# Patient Record
Sex: MALE | Race: WHITE | Employment: UNEMPLOYED | URBAN - METROPOLITAN AREA
[De-identification: names, ages, dates, MRNs, and addresses within clinical notes are randomized per-mention and may not be internally consistent; named-entity substitution may affect disease eponyms.]

---

## 2022-06-20 ENCOUNTER — ANESTHESIA EVENT (OUTPATIENT)
Dept: OPERATING ROOM | Age: 62
DRG: 989 | End: 2022-06-20

## 2022-06-20 ENCOUNTER — ANESTHESIA (OUTPATIENT)
Dept: OPERATING ROOM | Age: 62
DRG: 989 | End: 2022-06-20

## 2022-06-20 ENCOUNTER — APPOINTMENT (OUTPATIENT)
Dept: CT IMAGING | Age: 62
DRG: 989 | End: 2022-06-20

## 2022-06-20 ENCOUNTER — HOSPITAL ENCOUNTER (INPATIENT)
Age: 62
LOS: 1 days | Discharge: HOME | DRG: 989 | End: 2022-06-21
Attending: INTERNAL MEDICINE | Admitting: INTERNAL MEDICINE

## 2022-06-20 DIAGNOSIS — N32.89 BLADDER HEMORRHAGE: Primary | ICD-10-CM

## 2022-06-20 PROBLEM — R31.0 GROSS HEMATURIA: Status: ACTIVE | Noted: 2022-06-20

## 2022-06-20 LAB
A/G RATIO: 1.6 (ref 1.1–2.2)
ALBUMIN SERPL-MCNC: 4.4 G/DL (ref 3.4–5)
ALP BLD-CCNC: 90 U/L (ref 40–129)
ALT SERPL-CCNC: 23 U/L (ref 10–40)
ANION GAP SERPL CALCULATED.3IONS-SCNC: 11 MMOL/L (ref 3–16)
AST SERPL-CCNC: 16 U/L (ref 15–37)
BASOPHILS ABSOLUTE: 0.1 K/UL (ref 0–0.2)
BASOPHILS RELATIVE PERCENT: 0.7 %
BILIRUB SERPL-MCNC: 0.9 MG/DL (ref 0–1)
BILIRUBIN URINE: ABNORMAL
BLOOD, URINE: ABNORMAL
BUN BLDV-MCNC: 10 MG/DL (ref 7–20)
CALCIUM SERPL-MCNC: 10.2 MG/DL (ref 8.3–10.6)
CHLORIDE BLD-SCNC: 100 MMOL/L (ref 99–110)
CLARITY: ABNORMAL
CO2: 25 MMOL/L (ref 21–32)
COLOR: ABNORMAL
COMMENT UA: ABNORMAL
CREAT SERPL-MCNC: 1 MG/DL (ref 0.8–1.3)
EOSINOPHILS ABSOLUTE: 0.1 K/UL (ref 0–0.6)
EOSINOPHILS RELATIVE PERCENT: 0.7 %
GFR AFRICAN AMERICAN: >60
GFR NON-AFRICAN AMERICAN: >60
GLUCOSE BLD-MCNC: 134 MG/DL (ref 70–99)
GLUCOSE BLD-MCNC: 92 MG/DL (ref 70–99)
GLUCOSE URINE: ABNORMAL MG/DL
HCT VFR BLD CALC: 40.5 % (ref 40.5–52.5)
HEMOGLOBIN: 13.5 G/DL (ref 13.5–17.5)
KETONES, URINE: ABNORMAL MG/DL
LEUKOCYTE ESTERASE, URINE: ABNORMAL
LYMPHOCYTES ABSOLUTE: 1.6 K/UL (ref 1–5.1)
LYMPHOCYTES RELATIVE PERCENT: 13.9 %
MCH RBC QN AUTO: 27.4 PG (ref 26–34)
MCHC RBC AUTO-ENTMCNC: 33.2 G/DL (ref 31–36)
MCV RBC AUTO: 82.6 FL (ref 80–100)
MICROSCOPIC EXAMINATION: YES
MONOCYTES ABSOLUTE: 0.6 K/UL (ref 0–1.3)
MONOCYTES RELATIVE PERCENT: 5 %
NEUTROPHILS ABSOLUTE: 9.2 K/UL (ref 1.7–7.7)
NEUTROPHILS RELATIVE PERCENT: 79.7 %
NITRITE, URINE: ABNORMAL
PDW BLD-RTO: 12.9 % (ref 12.4–15.4)
PERFORMED ON: NORMAL
PH UA: ABNORMAL (ref 5–8)
PLATELET # BLD: 349 K/UL (ref 135–450)
PMV BLD AUTO: 7.6 FL (ref 5–10.5)
POTASSIUM SERPL-SCNC: 4 MMOL/L (ref 3.5–5.1)
PROTEIN UA: ABNORMAL MG/DL
RBC # BLD: 4.9 M/UL (ref 4.2–5.9)
RBC UA: ABNORMAL /HPF (ref 0–4)
SODIUM BLD-SCNC: 136 MMOL/L (ref 136–145)
SPECIFIC GRAVITY UA: ABNORMAL (ref 1–1.03)
TOTAL PROTEIN: 7.2 G/DL (ref 6.4–8.2)
URINE REFLEX TO CULTURE: ABNORMAL
URINE TYPE: ABNORMAL
UROBILINOGEN, URINE: ABNORMAL E.U./DL
WBC # BLD: 11.5 K/UL (ref 4–11)
WBC UA: ABNORMAL /HPF (ref 0–5)

## 2022-06-20 PROCEDURE — 2580000003 HC RX 258: Performed by: NURSE ANESTHETIST, CERTIFIED REGISTERED

## 2022-06-20 PROCEDURE — 3600000004 HC SURGERY LEVEL 4 BASE: Performed by: UROLOGY

## 2022-06-20 PROCEDURE — 2709999900 HC NON-CHARGEABLE SUPPLY: Performed by: UROLOGY

## 2022-06-20 PROCEDURE — 1200000000 HC SEMI PRIVATE

## 2022-06-20 PROCEDURE — 7100000000 HC PACU RECOVERY - FIRST 15 MIN: Performed by: UROLOGY

## 2022-06-20 PROCEDURE — 80053 COMPREHEN METABOLIC PANEL: CPT

## 2022-06-20 PROCEDURE — 0TCB8ZZ EXTIRPATION OF MATTER FROM BLADDER, VIA NATURAL OR ARTIFICIAL OPENING ENDOSCOPIC: ICD-10-PCS | Performed by: UROLOGY

## 2022-06-20 PROCEDURE — 6370000000 HC RX 637 (ALT 250 FOR IP): Performed by: UROLOGY

## 2022-06-20 PROCEDURE — 3700000001 HC ADD 15 MINUTES (ANESTHESIA): Performed by: UROLOGY

## 2022-06-20 PROCEDURE — 3700000000 HC ANESTHESIA ATTENDED CARE: Performed by: UROLOGY

## 2022-06-20 PROCEDURE — 3600000014 HC SURGERY LEVEL 4 ADDTL 15MIN: Performed by: UROLOGY

## 2022-06-20 PROCEDURE — 2580000003 HC RX 258: Performed by: UROLOGY

## 2022-06-20 PROCEDURE — 0V508ZZ DESTRUCTION OF PROSTATE, VIA NATURAL OR ARTIFICIAL OPENING ENDOSCOPIC: ICD-10-PCS | Performed by: UROLOGY

## 2022-06-20 PROCEDURE — 6360000002 HC RX W HCPCS: Performed by: NURSE ANESTHETIST, CERTIFIED REGISTERED

## 2022-06-20 PROCEDURE — 85025 COMPLETE CBC W/AUTO DIFF WBC: CPT

## 2022-06-20 PROCEDURE — 99285 EMERGENCY DEPT VISIT HI MDM: CPT

## 2022-06-20 PROCEDURE — 2720000010 HC SURG SUPPLY STERILE: Performed by: UROLOGY

## 2022-06-20 PROCEDURE — 74176 CT ABD & PELVIS W/O CONTRAST: CPT

## 2022-06-20 PROCEDURE — 7100000001 HC PACU RECOVERY - ADDTL 15 MIN: Performed by: UROLOGY

## 2022-06-20 PROCEDURE — 6360000002 HC RX W HCPCS: Performed by: UROLOGY

## 2022-06-20 PROCEDURE — 81001 URINALYSIS AUTO W/SCOPE: CPT

## 2022-06-20 RX ORDER — PROPOFOL 10 MG/ML
INJECTION, EMULSION INTRAVENOUS PRN
Status: DISCONTINUED | OUTPATIENT
Start: 2022-06-20 | End: 2022-06-20 | Stop reason: SDUPTHER

## 2022-06-20 RX ORDER — SODIUM CHLORIDE 0.9 % (FLUSH) 0.9 %
5-40 SYRINGE (ML) INJECTION EVERY 12 HOURS SCHEDULED
Status: DISCONTINUED | OUTPATIENT
Start: 2022-06-20 | End: 2022-06-20 | Stop reason: HOSPADM

## 2022-06-20 RX ORDER — LIDOCAINE HYDROCHLORIDE 20 MG/ML
JELLY TOPICAL
Status: COMPLETED | OUTPATIENT
Start: 2022-06-20 | End: 2022-06-20

## 2022-06-20 RX ORDER — SODIUM CHLORIDE 9 MG/ML
INJECTION, SOLUTION INTRAVENOUS CONTINUOUS PRN
Status: DISCONTINUED | OUTPATIENT
Start: 2022-06-20 | End: 2022-06-20 | Stop reason: SDUPTHER

## 2022-06-20 RX ORDER — POLYETHYLENE GLYCOL 3350 17 G/17G
17 POWDER, FOR SOLUTION ORAL DAILY PRN
Status: DISCONTINUED | OUTPATIENT
Start: 2022-06-20 | End: 2022-06-21 | Stop reason: HOSPADM

## 2022-06-20 RX ORDER — ACETAMINOPHEN 650 MG/1
650 SUPPOSITORY RECTAL EVERY 6 HOURS PRN
Status: DISCONTINUED | OUTPATIENT
Start: 2022-06-20 | End: 2022-06-21 | Stop reason: HOSPADM

## 2022-06-20 RX ORDER — DIPHENHYDRAMINE HYDROCHLORIDE 50 MG/ML
12.5 INJECTION INTRAMUSCULAR; INTRAVENOUS
Status: DISCONTINUED | OUTPATIENT
Start: 2022-06-20 | End: 2022-06-20 | Stop reason: HOSPADM

## 2022-06-20 RX ORDER — OXYCODONE HYDROCHLORIDE 5 MG/1
10 TABLET ORAL PRN
Status: DISCONTINUED | OUTPATIENT
Start: 2022-06-20 | End: 2022-06-20 | Stop reason: HOSPADM

## 2022-06-20 RX ORDER — HYDROCODONE BITARTRATE AND ACETAMINOPHEN 5; 325 MG/1; MG/1
1 TABLET ORAL EVERY 6 HOURS PRN
Status: DISCONTINUED | OUTPATIENT
Start: 2022-06-20 | End: 2022-06-21 | Stop reason: HOSPADM

## 2022-06-20 RX ORDER — SODIUM CHLORIDE 9 MG/ML
INJECTION, SOLUTION INTRAVENOUS PRN
Status: DISCONTINUED | OUTPATIENT
Start: 2022-06-20 | End: 2022-06-21 | Stop reason: HOSPADM

## 2022-06-20 RX ORDER — LANOLIN ALCOHOL/MO/W.PET/CERES
3 CREAM (GRAM) TOPICAL NIGHTLY PRN
Status: DISCONTINUED | OUTPATIENT
Start: 2022-06-20 | End: 2022-06-21 | Stop reason: HOSPADM

## 2022-06-20 RX ORDER — ACETAMINOPHEN 325 MG/1
650 TABLET ORAL EVERY 6 HOURS PRN
Status: DISCONTINUED | OUTPATIENT
Start: 2022-06-20 | End: 2022-06-21 | Stop reason: HOSPADM

## 2022-06-20 RX ORDER — ONDANSETRON 2 MG/ML
4 INJECTION INTRAMUSCULAR; INTRAVENOUS
Status: DISCONTINUED | OUTPATIENT
Start: 2022-06-20 | End: 2022-06-20 | Stop reason: HOSPADM

## 2022-06-20 RX ORDER — ATROPA BELLADONNA AND OPIUM 16.2; 3 MG/1; MG/1
30 SUPPOSITORY RECTAL EVERY 8 HOURS PRN
Status: DISCONTINUED | OUTPATIENT
Start: 2022-06-20 | End: 2022-06-21 | Stop reason: HOSPADM

## 2022-06-20 RX ORDER — ONDANSETRON 4 MG/1
4 TABLET, ORALLY DISINTEGRATING ORAL EVERY 8 HOURS PRN
Status: DISCONTINUED | OUTPATIENT
Start: 2022-06-20 | End: 2022-06-21 | Stop reason: HOSPADM

## 2022-06-20 RX ORDER — DEXTROSE MONOHYDRATE 50 MG/ML
100 INJECTION, SOLUTION INTRAVENOUS PRN
Status: DISCONTINUED | OUTPATIENT
Start: 2022-06-20 | End: 2022-06-21 | Stop reason: HOSPADM

## 2022-06-20 RX ORDER — LABETALOL HYDROCHLORIDE 5 MG/ML
10 INJECTION, SOLUTION INTRAVENOUS
Status: DISCONTINUED | OUTPATIENT
Start: 2022-06-20 | End: 2022-06-20 | Stop reason: HOSPADM

## 2022-06-20 RX ORDER — MEPERIDINE HYDROCHLORIDE 25 MG/ML
12.5 INJECTION INTRAMUSCULAR; INTRAVENOUS; SUBCUTANEOUS
Status: DISCONTINUED | OUTPATIENT
Start: 2022-06-20 | End: 2022-06-20 | Stop reason: HOSPADM

## 2022-06-20 RX ORDER — OXYCODONE HYDROCHLORIDE 5 MG/1
5 TABLET ORAL PRN
Status: DISCONTINUED | OUTPATIENT
Start: 2022-06-20 | End: 2022-06-20 | Stop reason: HOSPADM

## 2022-06-20 RX ORDER — SODIUM CHLORIDE 0.9 % (FLUSH) 0.9 %
5-40 SYRINGE (ML) INJECTION PRN
Status: DISCONTINUED | OUTPATIENT
Start: 2022-06-20 | End: 2022-06-21 | Stop reason: HOSPADM

## 2022-06-20 RX ORDER — SODIUM CHLORIDE 0.9 % (FLUSH) 0.9 %
5-40 SYRINGE (ML) INJECTION EVERY 12 HOURS SCHEDULED
Status: DISCONTINUED | OUTPATIENT
Start: 2022-06-20 | End: 2022-06-21 | Stop reason: HOSPADM

## 2022-06-20 RX ORDER — MAGNESIUM HYDROXIDE 1200 MG/15ML
LIQUID ORAL CONTINUOUS PRN
Status: COMPLETED | OUTPATIENT
Start: 2022-06-20 | End: 2022-06-20

## 2022-06-20 RX ORDER — INSULIN LISPRO 100 [IU]/ML
0-6 INJECTION, SOLUTION INTRAVENOUS; SUBCUTANEOUS
Status: DISCONTINUED | OUTPATIENT
Start: 2022-06-21 | End: 2022-06-21 | Stop reason: HOSPADM

## 2022-06-20 RX ORDER — SODIUM CHLORIDE 9 MG/ML
25 INJECTION, SOLUTION INTRAVENOUS PRN
Status: DISCONTINUED | OUTPATIENT
Start: 2022-06-20 | End: 2022-06-20 | Stop reason: HOSPADM

## 2022-06-20 RX ORDER — FENTANYL CITRATE 50 UG/ML
INJECTION, SOLUTION INTRAMUSCULAR; INTRAVENOUS PRN
Status: DISCONTINUED | OUTPATIENT
Start: 2022-06-20 | End: 2022-06-20 | Stop reason: SDUPTHER

## 2022-06-20 RX ORDER — HALOPERIDOL 5 MG/ML
1 INJECTION INTRAMUSCULAR
Status: DISCONTINUED | OUTPATIENT
Start: 2022-06-20 | End: 2022-06-20 | Stop reason: HOSPADM

## 2022-06-20 RX ORDER — MAGNESIUM HYDROXIDE 1200 MG/15ML
LIQUID ORAL
Status: COMPLETED | OUTPATIENT
Start: 2022-06-20 | End: 2022-06-20

## 2022-06-20 RX ORDER — HYDRALAZINE HYDROCHLORIDE 20 MG/ML
10 INJECTION INTRAMUSCULAR; INTRAVENOUS
Status: DISCONTINUED | OUTPATIENT
Start: 2022-06-20 | End: 2022-06-20 | Stop reason: HOSPADM

## 2022-06-20 RX ORDER — FENTANYL CITRATE 50 UG/ML
50 INJECTION, SOLUTION INTRAMUSCULAR; INTRAVENOUS EVERY 5 MIN PRN
Status: DISCONTINUED | OUTPATIENT
Start: 2022-06-20 | End: 2022-06-20 | Stop reason: HOSPADM

## 2022-06-20 RX ORDER — FENTANYL CITRATE 50 UG/ML
25 INJECTION, SOLUTION INTRAMUSCULAR; INTRAVENOUS EVERY 5 MIN PRN
Status: DISCONTINUED | OUTPATIENT
Start: 2022-06-20 | End: 2022-06-20 | Stop reason: HOSPADM

## 2022-06-20 RX ORDER — ONDANSETRON 2 MG/ML
4 INJECTION INTRAMUSCULAR; INTRAVENOUS EVERY 6 HOURS PRN
Status: DISCONTINUED | OUTPATIENT
Start: 2022-06-20 | End: 2022-06-21 | Stop reason: HOSPADM

## 2022-06-20 RX ORDER — SODIUM CHLORIDE 0.9 % (FLUSH) 0.9 %
5-40 SYRINGE (ML) INJECTION PRN
Status: DISCONTINUED | OUTPATIENT
Start: 2022-06-20 | End: 2022-06-20 | Stop reason: HOSPADM

## 2022-06-20 RX ADMIN — FENTANYL CITRATE 50 MCG: 50 INJECTION, SOLUTION INTRAMUSCULAR; INTRAVENOUS at 18:15

## 2022-06-20 RX ADMIN — SODIUM CHLORIDE: 9 INJECTION, SOLUTION INTRAVENOUS at 18:09

## 2022-06-20 RX ADMIN — FENTANYL CITRATE 50 MCG: 50 INJECTION, SOLUTION INTRAMUSCULAR; INTRAVENOUS at 18:37

## 2022-06-20 RX ADMIN — CEFAZOLIN 2000 MG: 2 INJECTION, POWDER, FOR SOLUTION INTRAMUSCULAR; INTRAVENOUS at 18:05

## 2022-06-20 RX ADMIN — PROPOFOL 50 MG: 10 INJECTION, EMULSION INTRAVENOUS at 18:13

## 2022-06-20 RX ADMIN — PROPOFOL 140 MCG/KG/MIN: 10 INJECTION, EMULSION INTRAVENOUS at 18:14

## 2022-06-20 ASSESSMENT — ENCOUNTER SYMPTOMS
RESPIRATORY NEGATIVE: 1
EYES NEGATIVE: 1
GASTROINTESTINAL NEGATIVE: 1

## 2022-06-20 ASSESSMENT — PAIN - FUNCTIONAL ASSESSMENT: PAIN_FUNCTIONAL_ASSESSMENT: NONE - DENIES PAIN

## 2022-06-20 NOTE — ANESTHESIA POSTPROCEDURE EVALUATION
Department of Anesthesiology  Postprocedure Note    Patient: Bk Poon  MRN: [id number]  YOB: 1960  Date of evaluation: 6/20/2022      Procedure Summary     Date: 06/20/22 Room / Location: [facility] OR [address] [place] [address]    Anesthesia Start: 1809 Anesthesia Stop: 1850    Procedure: [address] (N/A ) Diagnosis:       Hematuria, unspecified type      (Hematuria, unspecified type [R31.9])    Surgeons: Lakisha Liu MD Responsible Provider: Raudel Richards MD    Anesthesia Type: MAC ASA Status: 3          Anesthesia Type: No value filed.     Ruma Phase I: Ruma Score: 10    Ruma Phase II:      Last vitals:   Vitals Value Taken Time   /75 06/20/22 1900   Temp 97.8 °F (36.6 °C) 06/20/22 1900   Pulse 72 06/20/22 1900   Resp 15 06/20/22 1900   SpO2 99 % 06/20/22 1900         Anesthesia Post Evaluation    Patient location during evaluation: PACU  Patient participation: complete - patient participated  Level of consciousness: awake and alert  Airway patency: patent  Nausea & Vomiting: no nausea and no vomiting  Complications: no  Cardiovascular status: blood pressure returned to baseline  Respiratory status: acceptable  Hydration status: euvolemic  Multimodal analgesia pain management approach

## 2022-06-20 NOTE — PROGRESS NOTES
Phase 1 recovery completed, hospitalist to do admission orders. VSS. Urine remains clear. Will transfer.

## 2022-06-20 NOTE — H&P
HOSPITALISTS HISTORY AND PHYSICAL    6/20/2022 7:50 PM    Patient Information:  Remberto Allen is a 63 y.o. male [id number]  PCP:  RESHMA Rodriguez CNP (Tel: [phone] )    Chief complaint:    Chief Complaint   Patient presents with    Urinary Catheter     Recently had TURP done 6/7; has been having blood in Leonardo since Wed 6/15. Dr. Beckie Ross replaced Leonardo Catheter today. Beckie Ross states most likely will be admitted; Dr. March Roads to see and will need CBC, BUN, Cr.     Hematuria        History of Present Illness:  Mane Barragan is a 63 y.o. male with hx HTN, HLD, CAD, and Type II DM. Patient presents the emergency room earlier today for gross hematuria. He had a TURP procedure done on 6/7/22. He started having blood in his leonardo catheter on 6/15/22. He is on ASA and plavix at home. He held them prior to procedure but restarted on 6/10/22. His last dose was last evening. He was Dr. Beckie Ross earlier today to have catheter replaced. He continued to have bleeding so came to ER. He denies any fever, N/V, or abdominal pain. Mild discomfort from catheter. In ER CT of abdomen showed large amount of bleeding in Bladder. He was taken to OR for Cystoscopy with clot evacuation. 80cc of old blood removed, slight oozing from prostate fossa. CBI was continued post op.   hgb 13.5 prior to procedure. History obtained from patient     REVIEW OF SYSTEMS:   Review of Systems   Constitutional: Negative. HENT: Negative. Eyes: Negative. Respiratory: Negative. Cardiovascular: Negative. Gastrointestinal: Negative. Genitourinary: Positive for difficulty urinating, hematuria and penile pain. Musculoskeletal: Negative. Neurological: Negative. Hematological: Negative. Psychiatric/Behavioral: Negative. All other systems reviewed and are negative.       Past Medical History:   has a past medical history of Diabetes mellitus ([facility] [address] and Hypertension. Past Surgical History:   has a past surgical history that includes TURP and Coronary angioplasty with stent. Medications:  No current facility-administered medications on file prior to encounter. No current outpatient medications on file prior to encounter. Allergies: Allergies   Allergen Reactions    Zocor [Simvastatin] Nausea Only        Social History:  Patient Lives with wife   reports that he has quit smoking. He has never used smokeless tobacco. He reports current alcohol use. He reports that he does not use drugs. Family History:  family history includes No Known Problems in his mother. ,     Physical Exam:  /75   Pulse 72   Temp 97.8 °F (36.6 °C) (Temporal)   Resp 15   Ht 5' 11\" (1.803 m)   Wt 249 lb (112.9 kg)   SpO2 99%   BMI 34.73 kg/m²   Physical Exam  Vitals and nursing note reviewed. Constitutional:       Appearance: Normal appearance. He is not ill-appearing. HENT:      Head: Normocephalic. Mouth/Throat:      Mouth: Mucous membranes are moist.   Eyes:      Extraocular Movements: Extraocular movements intact. Pupils: Pupils are equal, round, and reactive to light. Cardiovascular:      Rate and Rhythm: Normal rate and regular rhythm. Pulses: Normal pulses. Heart sounds: No murmur heard. Pulmonary:      Effort: Pulmonary effort is normal. No respiratory distress. Breath sounds: Normal breath sounds. Abdominal:      General: Abdomen is flat. Bowel sounds are normal. There is no distension. Palpations: Abdomen is soft. Tenderness: There is no abdominal tenderness. Musculoskeletal:         General: Normal range of motion. Skin:     General: Skin is warm and dry. Capillary Refill: Capillary refill takes less than 2 seconds. Neurological:      General: No focal deficit present. Mental Status: He is alert and oriented to person, place, and time. Cranial Nerves: No cranial nerve deficit. Psychiatric:         Mood and Affect: Mood normal.         Behavior: Behavior normal.         Thought Content: Thought content normal.         Labs:  CBC:   Lab Results   Component Value Date    WBC 11.5 06/20/2022    RBC 4.90 06/20/2022    HGB 13.5 06/20/2022    HCT 40.5 06/20/2022    MCV 82.6 06/20/2022    MCH 27.4 06/20/2022    MCHC 33.2 06/20/2022    RDW 12.9 06/20/2022     06/20/2022    MPV 7.6 06/20/2022     BMP:    Lab Results   Component Value Date     06/20/2022    K 4.0 06/20/2022     06/20/2022    CO2 25 06/20/2022    BUN 10 06/20/2022    CREATININE 1.0 06/20/2022    CALCIUM 10.2 06/20/2022    GFRAA >60 06/20/2022    LABGLOM >60 06/20/2022    GLUCOSE 134 06/20/2022     CT ABDOMEN PELVIS WO CONTRAST Additional Contrast? None   Final Result   Large amount of hemorrhage within the bladder. Chest Xray:   EKG:    I visualized CXR images and EKG strips    Problem List  Principal Problem:    Gross hematuria  Resolved Problems:    * No resolved hospital problems. *        Assessment/Plan:   1. Gross Hematuria S/P Cystoscopy and Clot Evacuation  Admit inpatient  Urology following  Continuous bladder irrigation,  Drainage clear at this time  hgb 13.5,  Recheck in am, if has further bleeding will check sooner  Hold ASA and plavix  Patient reports minimal discomfort from catheter. No N/V and ready to eat. 2. Hypertension  BP stable. [facility]ing medication list from [email or web address]    3. Hyperlipidemia  Continue statin    4. CAD with PCI 10/2019  Hold ASA and plavix tonight, further recommendations per Urology and Cardiology  He follows with Cardiology from [facility].     5. Type II DM  Diet controlled  He recently lost 35lbs, with diet changes and medications. DVT prophylaxis SCD  Code status Full   Diet Diabetic   IV access peripheral   Ferguson Catheter CBI,    Admit as inpatient.  I anticipate hospitalization spanning more than two midnights for investigation and treatment of the above medically necessary diagnoses. Please note that some part of this chart was generated using Dragon dictation software. Although every effort was made to ensure the accuracy of this automated transcription, some errors in transcription may have occurred inadvertently. If you may need any clarification, please do not hesitate to contact me through [facility].        RESMHA Gallego CNP    6/20/2022 7:50 PM

## 2022-06-20 NOTE — ED PROVIDER NOTES
[address] [facility]        Pt Name: Angelito Davis  MRN: [id number]  [place] 1960  Date of evaluation: 6/20/2022  Provider: Cole Ferrara PA-C  PCP: RESHMA Catalan CNP  Note Started: 12:23 PM EDT       TIFFANY. I have evaluated this patient. My supervising physician was available for consultation. CHIEF COMPLAINT       Chief Complaint   Patient presents with    Urinary Catheter     Recently had TURP done 6/7; has been having blood in Ferguson since Wed 6/15. Dr. Bronson Salazar replaced Ferguson Catheter today. Bronson Salazar states most likely will be admitted; Dr. Arina Hernandez to see and will need CBC, BUN, Cr.     Hematuria       HISTORY OF PRESENT ILLNESS   (Location, Timing/Onset, Context/Setting, Quality, Duration, Modifying Factors, Severity, Associated Signs and Symptoms)  Note limiting factors. Chief Complaint: hematuria     Angelito Davis is a 63 y.o. male who presents for evaluation of hematuria. Patient states that he had TURP on 6/7/2022 and restarted aspirin and plavix on 6/10. Then, on 6/15 patient started noticing blood and blood clots in his catheter bag. He states that he saw Dr. Bronson Salazar today and Dr. Bronson Salazar replaced his catheter. Patient denies fever, infection, shortness of breath, abdominal pain, pelvic pain,  leg pain/swelling, nausea, vomiting, diarrhea, penile discharge or pain, testicular pain or swelling. Nursing Notes were all reviewed and agreed with or any disagreements were addressed in the HPI. REVIEW OF SYSTEMS    (2-9 systems for level 4, 10 or more for level 5)     Review of Systems    Positives and Pertinent negatives as per HPI. Except as noted above in the ROS, all other systems were reviewed and negative. PAST MEDICAL HISTORY     Past Medical History:   Diagnosis Date    Diabetes mellitus ([facility] [address]     Hypertension          SURGICAL HISTORY   History reviewed. No pertinent surgical history.       [address] Previous Medications    No medications on file         ALLERGIES     Zocor [simvastatin]    FAMILYHISTORY     History reviewed. No pertinent family history. SOCIAL HISTORY       Social History     Tobacco Use    Smoking status: Former Smoker    Smokeless tobacco: Never Used   Substance Use Topics    Alcohol use: Yes     Comment: socially    Drug use: Never       SCREENINGS    [place] Coma Scale  Eye Opening: Spontaneous  Best Verbal Response: Oriented  Best Motor Response: Obeys commands  [place] Coma Scale Score: 15        PHYSICAL EXAM    (up to 7 for level 4, 8 or more for level 5)     ED Triage Vitals [06/20/22 1208]   BP Temp Temp Source Heart Rate Resp SpO2 Height Weight   124/77 97.8 °F (36.6 °C) Oral 88 20 96 % 5' 11\" (1.803 m) 249 lb (112.9 kg)       Physical Exam  Vitals and nursing note reviewed. Constitutional:       General: He is not in acute distress. Appearance: He is well-developed. He is not diaphoretic. HENT:      Head: Normocephalic and atraumatic. Nose: Nose normal.   Eyes:      General:         Right eye: No discharge. Left eye: No discharge. Cardiovascular:      Rate and Rhythm: Normal rate and regular rhythm. Pulses: Normal pulses. Heart sounds: Normal heart sounds. No murmur heard. No friction rub. No gallop. Pulmonary:      Effort: Pulmonary effort is normal. No respiratory distress. Breath sounds: Normal breath sounds. No stridor. No wheezing, rhonchi or rales. Abdominal:      General: Bowel sounds are normal. There is no distension. Palpations: Abdomen is soft. There is no mass. Tenderness: There is no abdominal tenderness. There is no guarding or rebound. Hernia: No hernia is present. Genitourinary:     Comments: Patient has catheter bag full of blood/urine - no clots visualized  Musculoskeletal:         General: No swelling. Normal range of motion. Cervical back: Normal range of motion.    Skin:     General: Skin is warm and dry. Findings: No erythema or rash. Neurological:      Mental Status: He is alert and oriented to person, place, and time. Cranial Nerves: No cranial nerve deficit. Psychiatric:         Behavior: Behavior normal.         DIAGNOSTIC RESULTS   LABS:    Labs Reviewed   CBC WITH AUTO DIFFERENTIAL - Abnormal; Notable for the following components:       Result Value    WBC 11.5 (*)     Neutrophils Absolute 9.2 (*)     All other components within normal limits   COMPREHENSIVE METABOLIC PANEL - Abnormal; Notable for the following components:    Glucose 134 (*)     All other components within normal limits   URINALYSIS WITH REFLEX TO CULTURE - Abnormal; Notable for the following components:    Color, UA RED (*)     Clarity, UA TURBID (*)     Glucose, Ur Color Interfer (*)     Bilirubin Urine Color Interfer (*)     Ketones, Urine Color Interfer (*)     Blood, Urine LARGE (*)     pH, UA Color Interfer (*)     Protein, UA Color Interfer (*)     Urobilinogen, Urine Color Interfer (*)     Nitrite, Urine Color Interfer (*)     Leukocyte Esterase, Urine LARGE (*)     All other components within normal limits   MICROSCOPIC URINALYSIS - Abnormal; Notable for the following components:    WBC, UA see below (*)     RBC, UA see below (*)     All other components within normal limits       When ordered only abnormal lab results are displayed. All other labs were within normal range or not returned as of this dictation. EKG: When ordered, EKG's are interpreted by the Emergency Department Physician in the absence of a cardiologist.  Please see their note for interpretation of EKG.     RADIOLOGY:   Non-plain film images such as CT, Ultrasound and MRI are read by the radiologist. Plain radiographic images are visualized and preliminarily interpreted by the ED Provider with the below findings:        Interpretation per the Radiologist below, if available at the time of this note:    [address] CONTRAST Additional Contrast? None   Final Result   Large amount of hemorrhage within the bladder. No results found. PROCEDURES   Unless otherwise noted below, none     Procedures    CRITICAL CARE TIME       CONSULTS:  IP CONSULT TO UROLOGY  IP CONSULT TO HOSPITALIST      EMERGENCY DEPARTMENT COURSE and DIFFERENTIAL DIAGNOSIS/MDM:   Vitals:    Vitals:    06/20/22 1345 06/20/22 1400 06/20/22 1415 06/20/22 1652   BP:  93/72     Pulse: 89 87 (!) 106 99   Resp: 17 16 16    Temp:       TempSrc:       SpO2: 98% 96% 97%    Weight:       Height:           Patient was given the following medications:  Medications - No data to display      Is this patient to be included in the SEP-1 Core Measure due to severe sepsis or septic shock? No   Exclusion criteria - the patient is NOT to be included for SEP-1 Core Measure due to: Infection is not suspected    Patient presented with hematuria. Recently had TURP. Had Ferguson catheter placed by urology today and was sent here. Hemoglobin is stable and laboratory testing unremarkable. Discussed this with Dr. Jamia Blakely who is recommending CT without contrast to see if there is clot burden within the bladder. CT does reveal bladder hemorrhage. He has not had anything to drink since 840 or anything to eat since 7. We discussed back with Dr. Jamia Blakely and plan is for clot evacuation tonight. Patient understands he should not eat or drink anything. Discussed with hospitalist who admit for further work-up and treatment. Patient is nontoxic in appearance and stable time of admission. FINAL IMPRESSION      1. Bladder hemorrhage          DISPOSITION/PLAN   DISPOSITION Decision To Admit 06/20/2022 04:40:43 PM      PATIENT REFERRED TO:  No follow-up provider specified.     DISCHARGE MEDICATIONS:  New Prescriptions    No medications on file       DISCONTINUED MEDICATIONS:  Discontinued Medications    No medications on file              (Please note that portions of this note were completed with a voice recognition program.  Efforts were made to edit the dictations but occasionally words are mis-transcribed.)    Juanita Bang PA-C (electronically signed)            Juanita Bang PA-C  06/20/22 99 [id number]

## 2022-06-20 NOTE — CONSULTS
[facility] Consult Note  [facility]    Provider: Unknown MD STORM Hernández Patient ID:  Admission Date: 2022 Name: Taras Quinones  OR Date: n/a MRN: [id number]   Patient Location: ED- : 1960  Attending: No att. providers found Date of Service: 2022  PCP: RESHMA Herman - CNP     Diagnoses:  1. Bladder hemorrhage      BPH  Clot retention    Assessment/Plan:  62 yo M s/p TURP on 2022 and restarted aspirin and plavix on 6/10. [address] with clots last week, leonardo placed today in office for clot retention. Hgb stable but large volume clot in bladder and still with distended bladder on CT      - to OR for cystoscopy and clot evacuation  - hold blood thinners  - trend labs    All the patients questions were answered in detail. He understands the plan as listed above. · Review/order of labs  · Review/order of radiology studies  · discussion with referring MD  · Independent review and interpretation of test or study   Total time spent face-to-face with the patient 15 min, including greater than 50% of this time in discussion with the patient/family concerning the following:  · Recommended tests  · management options  · risks/benefits of management options  · importance of compliance  · Prognosis  · Risk factor reduction  · Patient/family education                                                                                                                                                      CC:   Chief Complaint   Patient presents with    Urinary Catheter     Recently had TURP done ; has been having blood in Leonardo since Wed 6/15. Dr. Blayne Kilgore replaced Leonardo Catheter today. Blayne Kilgore states most likely will be admitted; Dr. Bourne Fu to see and will need CBC, BUN, Cr.     Hematuria       HPI: Taras Quinones is a 63 y.o. male. I saw the patient today for [address] with clots s/p TURP    Past medical History:   He has a past medical history of Diabetes mellitus ([address] and Hypertension.     Past Surgical History:  He has no past surgical history on file. Allergies: Allergies   Allergen Reactions    Zocor [Simvastatin] Nausea Only       Social History:  He reports that he has quit smoking. He has never used smokeless tobacco. He reports current alcohol use. He reports that he does not use drugs. Family History:  family history is not on file. Medications:   Scheduled Meds:  Continuous Infusions:  PRN Meds:    Review of Systems:  Constitutional: Negative for fever    Genitourinary: see HPI  Eyes: negative for sudden change in vision  EENT: no complaints  Cardiovascular: Negative for chest pain  Respiratory: Negative for shortness of breath  Gastrointestinal: Negative for nausea  Musculoskeletal: Negative for back pain   Neurological: Negative for weakness  Psychiatric: Negative for anxiety  Integumentary: Negative for rashes or adenopathy     Physical Exam:  Vitals:    06/20/22 1652   BP:    Pulse: 99   Resp:    Temp:    SpO2:      Constitutional: NAD, well-developed, well-nourished. HEENT: MMM. Hearing intact. PERRL  Neck: no thyroid masses appreciated. Trachea is midline. Neck appears unremarkable   Lymph: no palpable adenopathy in supraclavicular, or axillary lymph nodes  Cardiovascular: Regular rate. No peripheral edema  Respiratory: Respirations are even and non-labored. No audible breath sounds. Genitourinary: leonardo with flora red blood  Abdomen: Soft. No distension, tenderness, hernias, masses or guarding. No CVA tenderness. No hernias appreciated. Liver and spleen appear normal  Psychiatric: A + O x 3, normal affect. Insight appears intact. Muskuloskeletal: DEBBY x 4   Skin: Pink, warm and dry. No rashes on face and arms.     Labs:  Lab Results   Component Value Date    WBC 11.5 (H) 06/20/2022    HGB 13.5 06/20/2022    HCT 40.5 06/20/2022    MCV 82.6 06/20/2022     06/20/2022     Lab Results   Component Value Date    CREATININE 1.0 06/20/2022    BUN 10 06/20/2022     06/20/2022    K 4.0 06/20/2022     06/20/2022    CO2 25 06/20/2022     No results found for: PSA     Imaging:   HISTORY:   ORDERING SYSTEM PROVIDED HISTORY: Hematuria, recent TURP, concern for clot   burden within bladder   TECHNOLOGIST PROVIDED HISTORY:   Reason for exam:->Hematuria, recent TURP, concern for clot burden within   bladder   Additional Contrast?->None   Decision Support Exception - unselect if not a suspected or confirmed   emergency medical condition->Emergency Medical Condition (MA)   Reason for Exam: Hematuria, recent TURP, concern for clot burden within   bladder     FINDINGS:   Lung bases unremarkable.  Osseous structures demonstrate no suspicious areas. Soft tissues demonstrate a small fat containing left inguinal hernia. Abdomen: Visualized pancreas is unremarkable.  The liver is normal.  The   gallbladder is normal.  The CBD is normal.  There are some mildly enlarged   lymph nodes in the peripancreatic space the largest measuring 1.1 x 1 cm. Adrenal glands normal.  Right kidney unremarkable.  Simple cyst left kidney   midpole measures 1 x 1 cm.  No renal calculi or hydronephrosis.  There are   some subcentimeter probable calcified splenic granulomas.  No retroperitoneal   lymphadenopathy.  No aortic aneurysm. Pelvis: Ferguson catheter within the bladder.  Large amount of hemorrhage is   noted within the bladder.  There is no gross bladder wall thickening, absence   of contrast limits evaluation.  The prostate is prominent.  No free fluid or   free air in the pelvis.  Moderate fecal content.  No colitis or   diverticulitis.  Appendix normal.  Mesentery normal.  No dilated bowel loops. Impression:     Large amount of hemorrhage within the bladder.           Loren Hodgson MD, MD  6/20/2022

## 2022-06-20 NOTE — ED NOTES
Patient is currently refusing CT as he is concerned with how he will pay for the scan. Patient does not have health insurance and states that he would like to talk to provider. PA aware.      Freda Javier, RN  06/20/22 2347

## 2022-06-20 NOTE — ANESTHESIA PRE PROCEDURE
Department of Anesthesiology  Preprocedure Note       Name:  Farshad Paz   Age:  63 y.o.  :  1960                                          MRN:  [id number]         Date:  2022      Surgeon: Kacey Ayon):  Mendez Rodriguez MD    Procedure: Procedure(s):  CYSTOSCOPY EVACUATION OF CLOTS    Medications prior to admission:   Prior to Admission medications    Not on File       Current medications:    Current Facility-Administered Medications   Medication Dose Route Frequency Provider Last Rate Last Admin    ceFAZolin (ANCEF) 2,000 mg in dextrose 5 % 50 mL IVPB (mini-bag)  2,000 mg IntraVENous Once Mendez Rodriguez MD           Allergies: Allergies   Allergen Reactions    Zocor [Simvastatin] Nausea Only       Problem List:  There is no problem list on file for this patient. Past Medical History:        Diagnosis Date    Diabetes mellitus ([facility] [address]     Hypertension        Past Surgical History:  History reviewed. No pertinent surgical history. Social History:    Social History     Tobacco Use    Smoking status: Former Smoker    Smokeless tobacco: Never Used   Substance Use Topics    Alcohol use: Yes     Comment: socially                                Counseling given: Not Answered      Vital Signs (Current):   Vitals:    22 1415 22 1652 22 1700 22 1742   BP:   113/79 106/73   Pulse: (!) 106 99 96 83   Resp: 16   16   Temp:    97.6 °F (36.4 °C)   TempSrc:    Temporal   SpO2: 97%  100% 98%   Weight:       Height:                                                  BP Readings from Last 3 Encounters:   22 106/73       NPO Status: Time of last liquid consumption: 0000                        Time of last solid consumption: 0000                        Date of last liquid consumption: 22                        Date of last solid food consumption: 22    BMI:   Wt Readings from Last 3 Encounters:   22 249 lb (112.9 kg)     Body mass index is 34.73 kg/m².     CBC: Lab Results   Component Value Date    WBC 11.5 06/20/2022    RBC 4.90 06/20/2022    HGB 13.5 06/20/2022    HCT 40.5 06/20/2022    MCV 82.6 06/20/2022    RDW 12.9 06/20/2022     06/20/2022       CMP:   Lab Results   Component Value Date     06/20/2022    K 4.0 06/20/2022     06/20/2022    CO2 25 06/20/2022    BUN 10 06/20/2022    CREATININE 1.0 06/20/2022    GFRAA >60 06/20/2022    AGRATIO 1.6 06/20/2022    LABGLOM >60 06/20/2022    GLUCOSE 134 06/20/2022    PROT 7.2 06/20/2022    CALCIUM 10.2 06/20/2022    BILITOT 0.9 06/20/2022    ALKPHOS 90 06/20/2022    AST 16 06/20/2022    ALT 23 06/20/2022       POC Tests: No results for input(s): POCGLU, POCNA, POCK, POCCL, POCBUN, POCHEMO, POCHCT in the last 72 hours. Coags: No results found for: PROTIME, INR, APTT    HCG (If Applicable): No results found for: PREGTESTUR, PREGSERUM, HCG, HCGQUANT     ABGs: No results found for: PHART, PO2ART, PQP8XEV, JCI0VYB, BEART, L5CIWDAM     Type & Screen (If Applicable):  No results found for: LABABO, LABRH    Drug/Infectious Status (If Applicable):  No results found for: HIV, HEPCAB    COVID-19 Screening (If Applicable): No results found for: COVID19        Anesthesia Evaluation  Patient summary reviewed no history of anesthetic complications:   Airway: Mallampati: I  TM distance: >3 FB   Neck ROM: limited  Mouth opening: > = 3 FB   Dental: normal exam         Pulmonary:Negative Pulmonary ROS                              Cardiovascular:    (+) hypertension:, dysrhythmias:,                   Neuro/Psych:   Negative Neuro/Psych ROS              GI/Hepatic/Renal:             Endo/Other:    (+) Diabetes, . Abdominal:   (+) obese,           Vascular: negative vascular ROS. Other Findings:           Anesthesia Plan      MAC     ASA 3     (OK for MAC or LMA general anesthetic.)  Induction: intravenous. MIPS: Postoperative opioids intended.   Anesthetic plan and risks discussed with patient. Plan discussed with CRNA.                     Roly Anthony MD   6/20/2022

## 2022-06-20 NOTE — OP NOTE
Urology Operative Report  [facility] [place]    Provider: Tanmay Diego MD MD Patient ID:  Admission Date: 2022 Name: Leona Gorman Date: 2022  MRN: [id number]   Patient Location: OR/NONE : 1960  Attending: No att. providers found Date of Service: 2022  PCP: RESHMA Urbina CNP     Date of Operation: 2022    Preoperative Diagnosis:   1. Gross hematuria with clots    Postoperative Diagnosis: same    Procedure:    1. Cystoscopy with clot evacuation  2. Fulguration of prostate fossa    Surgeon:   Tanmay Diego MD, MD    Anesthesia: Monitored Local Anesthesia with Sedation    Indications: Mary Kumar is a 63 y.o. male who presents for the above named surgery. Informed consent was obtained and the risks, benefits, and details of the procedure were explained to the patient who elected to proceed. Details of Procedure: The patient was brought to the operating room and placed in the supine position on the operating room table. SCDs were placed on the lower extremities. Following induction of anesthesia the patient was positioned in a lithotomy position, all pressure points were padded, and the genitals were prepped and draped in the usual sterile fashion. A routine timeout was performed, confirming the patient, procedure, site, risk of fire, patient allergies and confirming that preoperative antibiotics had been administered prior to beginning. A 21 fr rigid cystoscope was advanced via a normal appearing urethra into the bladder. The bladder was completely full of clot. About 600 cc old clot was removed with the jenny syringe. We then inserted the resectoscope and additional clot irrigation was done. There was some minor active oozing from the bladder neck. No arterial bleeding and no other bladder pathology. Bleeding was controlled as needed with cautery with the entire prostate fossa being fulgarated.  When we were completed the hemostasis was ensured by filling under low bladder pressure. The ureteral orifices were visualized and without injury. The bladder was left full and the scope was removed. There was good urinary flow. A 24 Leonardo was placed with return of light pink urine and CBI was started    At the end of the procedure all counts were correct. The patient tolerated the procedure well and was transported to the PACU in stable condition. Findings:  600 cc old clot removed. Started on CBI. Slight oozing from prostate fossa    Estimated Blood Loss: 25 cc new bleeding, 600 cc old blood                  Drains: 24 fr 3 way          Specimens: none     Complications: none apparent           Disposition:  PACU - hemodynamically stable. Plan: CBI overnight, hopefully home with leonardo if urine remains clear.  Continue to hold [facility] for now             Lyn Faye MD  6/20/2022

## 2022-06-20 NOTE — PROGRESS NOTES
Patient to PACU from OR. Patient is alert and oriented. VSS. Ferguson with irrigation in place, urine is clear.

## 2022-06-21 VITALS
SYSTOLIC BLOOD PRESSURE: 116 MMHG | WEIGHT: 257.28 LBS | RESPIRATION RATE: 16 BRPM | OXYGEN SATURATION: 97 % | HEART RATE: 76 BPM | HEIGHT: 71 IN | DIASTOLIC BLOOD PRESSURE: 65 MMHG | TEMPERATURE: 98.6 F | BODY MASS INDEX: 36.02 KG/M2

## 2022-06-21 PROBLEM — I25.10 CAD (CORONARY ARTERY DISEASE): Status: ACTIVE | Noted: 2022-06-21

## 2022-06-21 PROBLEM — E11.9 TYPE 2 DIABETES MELLITUS, WITHOUT LONG-TERM CURRENT USE OF INSULIN (HCC): Status: ACTIVE | Noted: 2022-06-21

## 2022-06-21 PROBLEM — E78.5 HYPERLIPIDEMIA: Status: ACTIVE | Noted: 2022-06-21

## 2022-06-21 PROBLEM — D62 ABLA (ACUTE BLOOD LOSS ANEMIA): Status: ACTIVE | Noted: 2022-06-21

## 2022-06-21 PROBLEM — I10 HYPERTENSION: Status: ACTIVE | Noted: 2022-06-21

## 2022-06-21 LAB
A/G RATIO: 1.4 (ref 1.1–2.2)
ALBUMIN SERPL-MCNC: 3.7 G/DL (ref 3.4–5)
ALP BLD-CCNC: 73 U/L (ref 40–129)
ALT SERPL-CCNC: 18 U/L (ref 10–40)
ANION GAP SERPL CALCULATED.3IONS-SCNC: 12 MMOL/L (ref 3–16)
AST SERPL-CCNC: 14 U/L (ref 15–37)
BASOPHILS ABSOLUTE: 0.1 K/UL (ref 0–0.2)
BASOPHILS RELATIVE PERCENT: 0.8 %
BILIRUB SERPL-MCNC: 1 MG/DL (ref 0–1)
BUN BLDV-MCNC: 12 MG/DL (ref 7–20)
CALCIUM SERPL-MCNC: 9.3 MG/DL (ref 8.3–10.6)
CHLORIDE BLD-SCNC: 103 MMOL/L (ref 99–110)
CO2: 25 MMOL/L (ref 21–32)
CREAT SERPL-MCNC: 0.9 MG/DL (ref 0.8–1.3)
EOSINOPHILS ABSOLUTE: 0.2 K/UL (ref 0–0.6)
EOSINOPHILS RELATIVE PERCENT: 1.5 %
FERRITIN: 288.7 NG/ML (ref 30–400)
GFR AFRICAN AMERICAN: >60
GFR NON-AFRICAN AMERICAN: >60
GLUCOSE BLD-MCNC: 103 MG/DL (ref 70–99)
GLUCOSE BLD-MCNC: 104 MG/DL (ref 70–99)
GLUCOSE BLD-MCNC: 125 MG/DL (ref 70–99)
HCT VFR BLD CALC: 35 % (ref 40.5–52.5)
HEMOGLOBIN: 11.8 G/DL (ref 13.5–17.5)
IRON SATURATION: 26 % (ref 20–50)
IRON: 56 UG/DL (ref 59–158)
LYMPHOCYTES ABSOLUTE: 1.7 K/UL (ref 1–5.1)
LYMPHOCYTES RELATIVE PERCENT: 16 %
MCH RBC QN AUTO: 27.9 PG (ref 26–34)
MCHC RBC AUTO-ENTMCNC: 33.6 G/DL (ref 31–36)
MCV RBC AUTO: 83 FL (ref 80–100)
MONOCYTES ABSOLUTE: 0.7 K/UL (ref 0–1.3)
MONOCYTES RELATIVE PERCENT: 6.2 %
NEUTROPHILS ABSOLUTE: 7.9 K/UL (ref 1.7–7.7)
NEUTROPHILS RELATIVE PERCENT: 75.5 %
PDW BLD-RTO: 13.2 % (ref 12.4–15.4)
PERFORMED ON: ABNORMAL
PERFORMED ON: ABNORMAL
PLATELET # BLD: 255 K/UL (ref 135–450)
PMV BLD AUTO: 7.7 FL (ref 5–10.5)
POTASSIUM REFLEX MAGNESIUM: 3.9 MMOL/L (ref 3.5–5.1)
RBC # BLD: 4.21 M/UL (ref 4.2–5.9)
SODIUM BLD-SCNC: 140 MMOL/L (ref 136–145)
TOTAL IRON BINDING CAPACITY: 213 UG/DL (ref 260–445)
TOTAL PROTEIN: 6.3 G/DL (ref 6.4–8.2)
WBC # BLD: 10.5 K/UL (ref 4–11)

## 2022-06-21 PROCEDURE — 36415 COLL VENOUS BLD VENIPUNCTURE: CPT

## 2022-06-21 PROCEDURE — 83550 IRON BINDING TEST: CPT

## 2022-06-21 PROCEDURE — 83540 ASSAY OF IRON: CPT

## 2022-06-21 PROCEDURE — 85025 COMPLETE CBC W/AUTO DIFF WBC: CPT

## 2022-06-21 PROCEDURE — 2580000003 HC RX 258: Performed by: NURSE PRACTITIONER

## 2022-06-21 PROCEDURE — 6370000000 HC RX 637 (ALT 250 FOR IP): Performed by: NURSE PRACTITIONER

## 2022-06-21 PROCEDURE — 82728 ASSAY OF FERRITIN: CPT

## 2022-06-21 PROCEDURE — 80053 COMPREHEN METABOLIC PANEL: CPT

## 2022-06-21 RX ORDER — AMLODIPINE BESYLATE 10 MG/1
10 TABLET ORAL DAILY
Status: ON HOLD | COMMUNITY
End: 2022-06-21 | Stop reason: HOSPADM

## 2022-06-21 RX ORDER — LISINOPRIL 40 MG/1
40 TABLET ORAL DAILY
Status: ON HOLD | COMMUNITY
End: 2022-06-21 | Stop reason: HOSPADM

## 2022-06-21 RX ORDER — ATORVASTATIN CALCIUM 40 MG/1
40 TABLET, FILM COATED ORAL DAILY
COMMUNITY

## 2022-06-21 RX ORDER — ESOMEPRAZOLE MAGNESIUM 40 MG/1
40 FOR SUSPENSION ORAL DAILY
COMMUNITY

## 2022-06-21 RX ORDER — CETIRIZINE HYDROCHLORIDE 10 MG/1
10 TABLET ORAL DAILY
COMMUNITY

## 2022-06-21 RX ORDER — FUROSEMIDE 40 MG/1
40 TABLET ORAL DAILY
Status: ON HOLD | COMMUNITY
End: 2022-06-21 | Stop reason: HOSPADM

## 2022-06-21 RX ORDER — POTASSIUM CHLORIDE 7.45 MG/ML
10 INJECTION INTRAVENOUS ONCE
Status: ON HOLD | COMMUNITY
End: 2022-06-21 | Stop reason: HOSPADM

## 2022-06-21 RX ORDER — FINASTERIDE 5 MG/1
5 TABLET, FILM COATED ORAL DAILY
COMMUNITY

## 2022-06-21 RX ORDER — TAMSULOSIN HYDROCHLORIDE 0.4 MG/1
0.4 CAPSULE ORAL DAILY
COMMUNITY

## 2022-06-21 RX ORDER — SIMETHICONE 80 MG
80 TABLET,CHEWABLE ORAL 4 TIMES DAILY PRN
Status: DISCONTINUED | OUTPATIENT
Start: 2022-06-21 | End: 2022-06-21 | Stop reason: HOSPADM

## 2022-06-21 RX ADMIN — HYDROCODONE BITARTRATE AND ACETAMINOPHEN 1 TABLET: 5; 325 TABLET ORAL at 04:22

## 2022-06-21 RX ADMIN — Medication 10 ML: at 09:00

## 2022-06-21 ASSESSMENT — PAIN SCALES - GENERAL
PAINLEVEL_OUTOF10: 0
PAINLEVEL_OUTOF10: 0
PAINLEVEL_OUTOF10: 4

## 2022-06-21 ASSESSMENT — PAIN - FUNCTIONAL ASSESSMENT: PAIN_FUNCTIONAL_ASSESSMENT: PREVENTS OR INTERFERES SOME ACTIVE ACTIVITIES AND ADLS

## 2022-06-21 ASSESSMENT — PAIN DESCRIPTION - LOCATION: LOCATION: PENIS

## 2022-06-21 NOTE — PLAN OF CARE
Problem: Chronic Conditions and Co-morbidities  Goal: Patient's chronic conditions and co-morbidity symptoms are monitored and maintained or improved  6/21/2022 1451 by Deng Flood RN  Outcome: Completed  6/21/2022 1442 by Deng Flood RN  Outcome: Progressing     Problem: Discharge Planning  Goal: Discharge to home or other facility with appropriate resources  6/21/2022 1451 by Deng Flood RN  Outcome: Completed  6/21/2022 1442 by Deng Flood RN  Outcome: Progressing     Problem: Pain  Goal: Verbalizes/displays adequate comfort level or baseline comfort level  6/21/2022 1451 by Deng Flood RN  Outcome: Completed  6/21/2022 1442 by Deng Flood RN  Outcome: Progressing  Flowsheets (Taken 6/21/2022 1442)  Verbalizes/displays adequate comfort level or baseline comfort level:   Encourage patient to monitor pain and request assistance   Assess pain using appropriate pain scale  Note: Pt stated that his pain on a scale from 0-10, as a 0/10.       Problem: Safety - Adult  Goal: Free from fall injury  6/21/2022 1451 by Deng Flood RN  Outcome: Completed  6/21/2022 1442 by Deng Flood RN  Outcome: Progressing     Problem: ABCDS Injury Assessment  Goal: Absence of physical injury  6/21/2022 1451 by Deng Flood RN  Outcome: Completed  6/21/2022 1442 by Deng Flood RN  Outcome: Progressing

## 2022-06-21 NOTE — DISCHARGE SUMMARY
[facility] DISCHARGE SUMMARY    Patient Demographics    Patient. Tashi Aceves  Date of Birth. 1960  MRN. [id number]     Primary care provider. RESHMA Polo YURI  (Tel: [phone])    Admit date: 6/20/2022    Discharge date (blank if same as Note Date): Note Date: 6/21/2022     Reason for Hospitalization. Chief Complaint   Patient presents with    Urinary Catheter     Recently had TURP done 6/7; has been having blood in Leonardo since Wed 6/15. Dr. Sebas Austin replaced Leonardo Catheter today. Sebas Austin states most likely will be admitted; Dr. Martha Estevez to see and will need CBC, BUN, Cr.     Hematuria         Significant Findings. Principal Problem:    Gross hematuria  Active Problems:    ABLA (acute blood loss anemia)    Hyperlipidemia    Hypertension    Type 2 diabetes mellitus, without long-term current use of insulin (HCC)    CAD (coronary artery disease)  Resolved Problems:    * No resolved hospital problems. *       Problems and results from this hospitalization that need follow up. 1. Hematuria  2. Anemia  3. Hypertension    Significant test results and incidental findings. 1.   CT ABDOMEN PELVIS WO CONTRAST Additional Contrast? None   Final Result   Large amount of hemorrhage within the bladder. Invasive procedures and treatments. 1. None     Problem-based Hospital Course. Tashi Aceves is a 63 y.o. male with hx HTN, HLD, CAD, and Type II DM who presented the ED with c/o gross hematuria. He had a TURP procedure done on 6/7/22. He started having blood in his leonardo catheter on 6/15/22. He is on ASA and plavix at home. He held them prior to procedure but restarted on 6/10/22. His last dose was on 6/19. He was seen by Dr. Sebas Austin earlier on 6/20/22 & had catheter replaced but continued to have bleeding so came to ER. He denied any fever, N/V, or abdominal pain but endorsed mild discomfort from catheter.    In ER CT of abdomen showed large amount of bleeding in Bladder. He was taken to OR for Cystoscopy with clot evacuation. 80cc of old blood was removed with slight oozing from prostate fossa. CBI was continued post op. Hgb was 13.5 prior to procedure. Gross Hematuria s/p Cystoscopy and Clot Evacuation:  Urology following:   CBI clamped. Ferguson catheter remains in place. Cleared for discharge home if urine remains clear. Patient follow-up with urology in 1 week. Acute blood loss anemia due to hematuria:  Admission hemoglobin 13.5,   now 11.8/dL. Iron indices pending. Discharged on oral iron. Hypertension: BP stable off medications. Advised to hold lisinopril, amlodipine and Lasix until follow-up with PCP.       Hyperlipidemia: Continued statin.       CAD with PCI 10/2019:  ASA and plavix on hold until follow-up appointment with urology in 1 week. Follow-up with cardiologist outpatient.       Type II DM:  Diet controlled  He recently lost 35lbs, with diet changes and medications. Consults. IP CONSULT TO UROLOGY  IP CONSULT TO HOSPITALIST  IP CONSULT TO SOCIAL WORK    Physical examination on discharge day. /65   Pulse 76   Temp 98.2 °F (36.8 °C) (Oral)   Resp 16   Ht 5' 11\" (1.803 m)   Wt 257 lb 4.4 oz (116.7 kg)   SpO2 97%   BMI 35.88 kg/m²   General appearance. Alert. Looks comfortable. HEENT. Sclera clear. Moist mucus membranes. Cardiovascular. Regular rate and rhythm, normal S1, S2. No murmur. Respiratory. Not using accessory muscles. Clear to auscultation bilaterally, no wheeze. Gastrointestinal. Abdomen soft, non-tender, not distended, normal bowel sounds  Neurology. Facial symmetry. No speech deficits. Moving all extremities equally. Extremities. No edema in lower extremities. Skin. Warm, dry, normal turgor    Ferguson catheter in place with clear lemuel urine. Condition at time of discharge: Stable. Medication instructions provided to patient at discharge.      Medication List      CONTINUE taking these medications    atorvastatin 40 MG tablet  Commonly known as: LIPITOR     cetirizine 10 MG tablet  Commonly known as: ZYRTEC     esomeprazole Magnesium 40 MG Pack  Commonly known as: NEXIUM     finasteride 5 MG tablet  Commonly known as: PROSCAR     metFORMIN 500 MG tablet  Commonly known as: GLUCOPHAGE     tamsulosin 0.4 MG capsule  Commonly known as: FLOMAX     Vitamin D3 125 MCG (5000 UT) Tabs        STOP taking these medications    amLODIPine 10 MG tablet  Commonly known as: NORVASC     furosemide 40 MG tablet  Commonly known as: LASIX     lisinopril 40 MG tablet  Commonly known as: PRINIVIL;ZESTRIL     metoprolol tartrate 25 MG tablet  Commonly known as: LOPRESSOR     potassium chloride 10 MEQ/100ML            Discharge recommendations given to patient. Follow Up. With PCP in 1 week   Disposition. home  Activity. activity as tolerated  Diet: ADULT DIET; Regular; 4 carb choices (60 gm/meal)      Spent 35 minutes in discharge process.     Signed:  Hayden Junior MD     6/21/2022 1:14 PM

## 2022-06-21 NOTE — PLAN OF CARE
Problem: Chronic Conditions and Co-morbidities  Goal: Patient's chronic conditions and co-morbidity symptoms are monitored and maintained or improved  Outcome: Progressing     Problem: Discharge Planning  Goal: Discharge to home or other facility with appropriate resources  Outcome: Progressing     Problem: Pain  Goal: Verbalizes/displays adequate comfort level or baseline comfort level  Outcome: Progressing  Flowsheets (Taken 6/21/2022 2841)  Verbalizes/displays adequate comfort level or baseline comfort level:   Encourage patient to monitor pain and request assistance   Assess pain using appropriate pain scale  Note: Pt stated that his pain on a scale from 0-10, as a 0/10.       Problem: Safety - Adult  Goal: Free from fall injury  Outcome: Progressing     Problem: ABCDS Injury Assessment  Goal: Absence of physical injury  Outcome: Progressing

## 2022-06-21 NOTE — PROGRESS NOTES
Data- discharge order received, pt verbalized agreement to discharge, disposition to previous residence, no needs for HHC/DME. Action- discharge instructions prepared and given to pt, pt verbalized understanding. Medication information packet given r/t NEW and/or CHANGED prescriptions emphasizing name/purpose/side effects, pt verbalized understanding. Discharge instruction summary: Diet- regukar, Activity- up as tolerated, Primary Care Physician as followsRESHMA Gramajo - [phone] f/u appointment in 1 week, immunizations reviewed and updated, prescription medications filled at pharmacy. 1. WEIGHT: Admit Weight: 249 lb (112.9 kg) (06/20/22 1208)        Today  Weight: 257 lb 4.4 oz (116.7 kg) (06/21/22 0422)       2. O2 SAT.: SpO2: 97 % (06/21/22 1145)    Response- Pt belongings gathered, IV removed. Disposition is home (no HHC/DME needs), transported with RN, taken to lobby via w/c w/ wife, no complications.

## 2022-06-21 NOTE — PROGRESS NOTES
Urology Progress Note  [facility]    Provider: RESHMA Bray CNP  Patient ID:  Admission Date: 2022 Name: Sima Norris Date: 2022 MRN: [id number]   [facility]-01 : 1960  Attending: Ludy Canseco MD Date of Service: 2022  PCP: RESHMA Shankar CNP     Diagnoses:  1. Bladder hemorrhage       BPH  Clot retention     Assessment/Plan:  62 yo M s/p TURP on 2022   S/p clot evacuation 2022  Leonardo in place with CBI, urine draining clear pink  Hgb 11.8    recc  CBI clamped  Home with leonardo if urine stays clear  F/u x1 week, hold AC until he has appointment    The patient had a chance to ask questions which were answered. he understands the above plan. Subjective:   Tayler Villanueva is a 63 y.o. male. He was seen and examined this morning. Today we discussed holding AC until f/u visit. Discussed leonardo management. Objective:   Vitals:  Vitals:    22 0830   BP: 120/70   Pulse: 82   Resp:    Temp:    SpO2:        Intake/Output Summary (Last 24 hours) at 2022 0847  Last data filed at 2022 0830  Gross per 24 hour   Intake 450 ml   Output 8675 ml   Net -8225 ml     Physical Exam:  Gen: Alert and oriented x3, no acute distress  CV: Regular rate   Resp: unlabored respirations  Abd: Soft, non-distended, non-tender, no masses  Ext: no peripheral edema noted, moves upper and lower extremities spontaneously  Skin: warmand well perfused, no rashes noted on the face, or arms.      Labs:  Lab Results   Component Value Date    WBC 10.5 2022    HGB 11.8 (L) 2022    HCT 35.0 (L) 2022    MCV 83.0 2022     2022     Lab Results   Component Value Date    CREATININE 0.9 2022    BUN 12 2022     2022    K 3.9 2022     2022    CO2 25 2022       RESHMA Bray - CNP   2022

## 2022-06-21 NOTE — PROGRESS NOTES
Physician Progress Note      Sudheer Haas  CSN #:                  [id number]  :                       1960  ADMIT DATE:       2022 11:57 AM  DISCH DATE:  RESPONDING  PROVIDER #:        Nano Garcia          QUERY TEXT:    Pt admitted with Gross hematuria  and underwent TURP on 22.? If possible,   please document in progress notes and discharge summary:    The medical record reflects the following:  Risk Factors: TURP  Clinical Indicators: Patient to the ED from Urology office, pt had a TURP on   22, resumed ASA & Plavix and noticed blood and blood clots in Ferguson   catheter bag. Catheter was replaced in MD office prior to sending to ED. CT of   abdomen with impression of large amount of hemorrhage within the bladder. Plan for surgical intervention, with Cystoscopy with clot evacuation and   fulguration of prostate fossa. OR note with \"600 cc old clot removed, minor   active oozing from bladder neck, bleeding controlled with cautery with entire   prostate fossa being fulgarated. \"  Treatment: Surgical intervention, Monitoring of HH, CT scan, Continuous   bladder irrigation  Options provided:  -- Gross hematuria is a postoperative complication  -- Gross hematuria is not a postoperative complication, but is due to other   incidental risk factor, Please specify other incidental risk factor. -- Other - I will add my own diagnosis  -- Disagree - Not applicable / Not valid  -- Disagree - Clinically unable to determine / Unknown  -- Refer to Clinical Documentation Reviewer    PROVIDER RESPONSE TEXT:    Patient has Gross hematuria as a postoperative complication.     Query created by: Madelyn Chun on 2022 10:30 AM      Electronically signed by:  Nano Garcia 2022 10:33 AM

## 2022-06-22 NOTE — PROGRESS NOTES
Physician Progress Note      Raymundo Barriga  CSN #:                  [id number]  :                       1960  ADMIT DATE:       2022 11:57 AM  DISCH DATE:        2022 2:49 PM  RESPONDING  PROVIDER #:        Jenny Navarro          QUERY TEXT:    Pt admitted with gross hematuria. Noted documentation of Gross hematuria as a   postoperative complication on  by ordered Urology consultant. If   possible, please document in progress notes and discharge summary:    The medical record reflects the following:  Risk Factors: TURP  Clinical Indicators: Noted Urology progress note of 22 which states   \"patient has gross hematuria as a postoperative complication. Patient to ED   from urology office, had TURP on 22, CT showed large amount of hemorrhage   in bladder, taken to OR and Cysto with clot evacuation with fulguration of   prostate fossa done. Treatment: Surgical intervention, Monitoring of HH, CT scan, continuous   bladder irrigation. Options provided:  -- Gross hematuria as a post operative complication confirmed present on   admission  -- Other - I will add my own diagnosis  -- Disagree - Not applicable / Not valid  -- Disagree - Clinically unable to determine / Unknown  -- Refer to Clinical Documentation Reviewer    PROVIDER RESPONSE TEXT:    The diagnosis of Gross hematuria as a postoperative complication was confirmed   as present on admission.     Query created by: Carmelo Elmore on 2022 9:46 AM      Electronically signed by:  Jenny Navarro 2022 9:49 AM

## 2022-08-29 NOTE — CARE COORDINATION
Discharge Planning Note  Patient's Chart reviewed for discharge planning needs, admitted from home, A&O, independent  and it appears that patient has minimal needs for discharge at this time with readmission score of 6% . Discussed with patients nurse and requested that case management be notified if discharge needs are identified. Patient has no insurance,  Financial counselor consulted      Elease Dakins, APRN - CNP is listed as the PCP. Case management will continue to follow progress and update discharge plan as needed. Addendum; Patient was screened by the Financial counselor, -May qualify for FAF. Patient is also active with [employer]. No...

## (undated) DEVICE — CUTTING LOOP, BIPOLAR, 24/26 FR.: Brand: N.A.

## (undated) DEVICE — GLOVE ORANGE PI 7 1/2   MSG9075

## (undated) DEVICE — CYSTO/BLADDER IRRIGATION SET, REGULATING CLAMP

## (undated) DEVICE — BAG,DRAINAGE,4L,A/R TOWER,LL,SLIDE TAP: Brand: MEDLINE

## (undated) DEVICE — SOLUTION IV IRRIG WATER 1000ML POUR BRL 2F7114

## (undated) DEVICE — WET SKIN PREP TRAY: Brand: MEDLINE INDUSTRIES, INC.

## (undated) DEVICE — BAG DRAINAGE NS

## (undated) DEVICE — MERCY FAIRFIELD TURNOVER KIT: Brand: MEDLINE INDUSTRIES, INC.

## (undated) DEVICE — SYRINGE MED 30ML STD CLR PLAS LUERSLIP TIP N CTRL DISP

## (undated) DEVICE — SOLUTION IRRIG 3000ML 1.5% GL USP UROMATIC CONT

## (undated) DEVICE — CYSTO PACK: Brand: MEDLINE INDUSTRIES, INC.

## (undated) DEVICE — CATHETER URETH 24FR BLLN 30CC STD LTX 3 W TWO OPP DRNGE EYE

## (undated) DEVICE — SYRINGE,TOOMEY,IRRIGATION,70CC,STERILE: Brand: MEDLINE